# Patient Record
Sex: FEMALE | Race: WHITE | Employment: OTHER | URBAN - METROPOLITAN AREA
[De-identification: names, ages, dates, MRNs, and addresses within clinical notes are randomized per-mention and may not be internally consistent; named-entity substitution may affect disease eponyms.]

---

## 2024-03-06 ENCOUNTER — HOSPITAL ENCOUNTER (OUTPATIENT)
Dept: ULTRASOUND IMAGING | Facility: CLINIC | Age: 44
Discharge: HOME | End: 2024-03-06
Payer: COMMERCIAL

## 2024-03-06 VITALS — HEIGHT: 64 IN | WEIGHT: 105 LBS | BODY MASS INDEX: 17.93 KG/M2

## 2024-03-06 DIAGNOSIS — R92.343 MAMMOGRAPHIC EXTREME DENSITY, BILATERAL BREASTS: ICD-10-CM

## 2024-03-06 PROCEDURE — 76641 ULTRASOUND BREAST COMPLETE: CPT

## 2024-07-24 ENCOUNTER — OFFICE VISIT (OUTPATIENT)
Dept: OBGYN CLINIC | Facility: CLINIC | Age: 44
End: 2024-07-24
Payer: COMMERCIAL

## 2024-07-24 VITALS
BODY MASS INDEX: 17.93 KG/M2 | DIASTOLIC BLOOD PRESSURE: 80 MMHG | HEIGHT: 64 IN | WEIGHT: 105 LBS | SYSTOLIC BLOOD PRESSURE: 116 MMHG

## 2024-07-24 DIAGNOSIS — N80.9 ENDOMETRIOSIS DETERMINED BY LAPAROSCOPY: ICD-10-CM

## 2024-07-24 DIAGNOSIS — R10.31 RLQ ABDOMINAL PAIN: Primary | ICD-10-CM

## 2024-07-24 PROCEDURE — 99204 OFFICE O/P NEW MOD 45 MIN: CPT | Performed by: STUDENT IN AN ORGANIZED HEALTH CARE EDUCATION/TRAINING PROGRAM

## 2024-07-24 NOTE — PROGRESS NOTES
"Ambulatory Visit  Name: Izzy Magaña      : 1980      MRN: [id number]  Encounter Provider: Lori Taylor MD  Encounter Date: 2024   Encounter department: [facility]    Assessment & Plan   1. RLQ abdominal pain  -     US pelvis complete w transvaginal; Future; Expected date: 2024  2. Endometriosis determined by laparoscopy  - reviewed general treatment recommendations for endometriosis to treat if not attempting pregnancy or actively pregnant, for prevention of further growth of lesions. Encouraged Izzy to consider this, as she has deferred embryo implantation until October.   - recommend TVUS to determine if any visible endometriomas. If visible lesions seen, would recommend discussion with [facility] regarding timing of surgical intervention and embryo implantation     History of Present Illness     Izzy Magaña is a 43 y.o. female who presents for consultation regarding worsening RLQ pain.     She has a long history of endometriosis, s/p debulking in 2018 in [place]. Attempted pregnancy after this, without success, then did IVF for her child, born in 10/2022 ( delivery, scheduled/elective).    Previously had IUD in place, removed 2023, as she had thought that she would do an embryo transfer. However, she has since decided that she would like to wait until October.     Since IUD removal, she notes that she has developed RLQ pain, consistent with her previous endometriosis pain. She is seeking evaluation, unsure if she should undergo laparoscopy prior to embryo transfer.     Review of Systems as above    Objective     /80 (BP Location: Left arm, Patient Position: Sitting, Cuff Size: Standard)   Ht 5' 4\" (1.626 m)   Wt 47.6 kg (105 lb)   LMP 2024 (Exact Date)   BMI 18.02 kg/m²     Physical Exam  Vitals and nursing note reviewed.   Constitutional:       General: She is not in acute distress.     Appearance: She is well-developed. "   HENT:      Head: Normocephalic and atraumatic.   Cardiovascular:      Rate and Rhythm: Normal rate.   Pulmonary:      Effort: Pulmonary effort is normal. No respiratory distress.   Genitourinary:     General: Normal vulva.      Vagina: Normal.      Cervix: Normal. No cervical motion tenderness, lesion or cervical bleeding.      Uterus: Deviated and fixed.       Comments: No endometrial implants visible in vagina  Uterus somewhat deviated and fixed to the right, particularly in the area of expressed discomfort. No discrete mass/cyst palpated  Skin:     General: Skin is warm and dry.      Capillary Refill: Capillary refill takes less than 2 seconds.   Neurological:      Mental Status: She is alert.   Psychiatric:         Mood and Affect: Mood normal.     Administrative Statements       [facility] records reviewed - no Pap collected there, but documentation of Pap 2022. Unclear if records visualized.   Labor and Delivery records reviewed from [facility] - operative report without any note of endometriosis or findings in the pelvis

## 2024-08-02 ENCOUNTER — HOSPITAL ENCOUNTER (OUTPATIENT)
Dept: ULTRASOUND IMAGING | Facility: CLINIC | Age: 44
Discharge: HOME | End: 2024-08-02
Payer: COMMERCIAL

## 2024-08-02 DIAGNOSIS — R10.31 RLQ ABDOMINAL PAIN: ICD-10-CM

## 2024-08-02 PROCEDURE — 76856 US EXAM PELVIC COMPLETE: CPT

## 2024-08-02 PROCEDURE — 76830 TRANSVAGINAL US NON-OB: CPT

## 2024-10-17 ENCOUNTER — TELEPHONE (OUTPATIENT)
Age: 44
End: 2024-10-17

## 2024-10-17 DIAGNOSIS — Z12.31 SCREENING MAMMOGRAM, ENCOUNTER FOR: Primary | ICD-10-CM

## 2024-10-17 NOTE — TELEPHONE ENCOUNTER
Pt called looking to obtain script for mammo stating she is due 11/1/24. [place] [facility] [place] Pt is scheduled for her yearly on 1/2/25 and on the wait list for any sooner appts. Thank you.

## 2024-11-09 ENCOUNTER — HOSPITAL ENCOUNTER (OUTPATIENT)
Dept: MAMMOGRAPHY | Facility: CLINIC | Age: 44
Discharge: HOME | End: 2024-11-09
Payer: COMMERCIAL

## 2024-11-09 DIAGNOSIS — Z12.31 SCREENING MAMMOGRAM, ENCOUNTER FOR: ICD-10-CM

## 2024-11-09 PROCEDURE — 77067 SCR MAMMO BI INCL CAD: CPT

## 2024-11-09 PROCEDURE — 77063 BREAST TOMOSYNTHESIS BI: CPT

## 2025-05-20 ENCOUNTER — ANNUAL EXAM (OUTPATIENT)
Age: 45
End: 2025-05-20
Payer: COMMERCIAL

## 2025-05-20 VITALS
WEIGHT: 106 LBS | BODY MASS INDEX: 18.1 KG/M2 | SYSTOLIC BLOOD PRESSURE: 100 MMHG | HEIGHT: 64 IN | DIASTOLIC BLOOD PRESSURE: 76 MMHG

## 2025-05-20 DIAGNOSIS — Z72.51 HIGH RISK HETEROSEXUAL BEHAVIOR: ICD-10-CM

## 2025-05-20 DIAGNOSIS — Z12.31 ENCOUNTER FOR SCREENING MAMMOGRAM FOR BREAST CANCER: ICD-10-CM

## 2025-05-20 DIAGNOSIS — Z12.4 ENCOUNTER FOR SCREENING FOR CERVICAL CANCER: Primary | ICD-10-CM

## 2025-05-20 DIAGNOSIS — N80.9 ENDOMETRIOSIS DETERMINED BY LAPAROSCOPY: ICD-10-CM

## 2025-05-20 DIAGNOSIS — Z11.3 SCREENING FOR STDS (SEXUALLY TRANSMITTED DISEASES): ICD-10-CM

## 2025-05-20 PROCEDURE — 87591 N.GONORRHOEAE DNA AMP PROB: CPT | Performed by: STUDENT IN AN ORGANIZED HEALTH CARE EDUCATION/TRAINING PROGRAM

## 2025-05-20 PROCEDURE — 99396 PREV VISIT EST AGE 40-64: CPT | Performed by: STUDENT IN AN ORGANIZED HEALTH CARE EDUCATION/TRAINING PROGRAM

## 2025-05-20 PROCEDURE — 87491 CHLMYD TRACH DNA AMP PROBE: CPT | Performed by: STUDENT IN AN ORGANIZED HEALTH CARE EDUCATION/TRAINING PROGRAM

## 2025-05-20 PROCEDURE — 87661 TRICHOMONAS VAGINALIS AMPLIF: CPT | Performed by: STUDENT IN AN ORGANIZED HEALTH CARE EDUCATION/TRAINING PROGRAM

## 2025-05-20 PROCEDURE — G0476 HPV COMBO ASSAY CA SCREEN: HCPCS | Performed by: STUDENT IN AN ORGANIZED HEALTH CARE EDUCATION/TRAINING PROGRAM

## 2025-05-20 PROCEDURE — 99214 OFFICE O/P EST MOD 30 MIN: CPT | Performed by: STUDENT IN AN ORGANIZED HEALTH CARE EDUCATION/TRAINING PROGRAM

## 2025-05-20 PROCEDURE — G0145 SCR C/V CYTO,THINLAYER,RESCR: HCPCS | Performed by: STUDENT IN AN ORGANIZED HEALTH CARE EDUCATION/TRAINING PROGRAM

## 2025-05-20 RX ORDER — LORATADINE 10 MG/1
10 TABLET ORAL DAILY
COMMUNITY

## 2025-05-20 RX ORDER — NORGESTIMATE AND ETHINYL ESTRADIOL 0.25-0.035
1 KIT ORAL DAILY
Qty: 84 TABLET | Refills: 2 | Status: SHIPPED | OUTPATIENT
Start: 2025-05-20

## 2025-05-20 NOTE — PROGRESS NOTES
Izzy Magaña  1980    Assessment and Plan:  Yearly exam without abnormality.     -Pap collected today. We reviewed ASCCP guidelines for Pap testing today.   -Mammo slip provided   -STD testing today  -Endometriosis: reviewed pathophysiology and first line management of endometriosis today. Recommend TVUS for evaluation for possible hydrosalpinx, as well as endometrioma. Discussed that this would impact plan for surgical management. Discussed options between medical management and hysterectomy/BSO generally, and she will consider her options further. Safe and effective use of OCP reviewed and she will start this, f/u in 3 months for BP and symptom check, possible preop.     RTO one year for yearly exam or sooner as needed.        CC:  Yearly exam    S:  44 y.o. female here for yearly exam.     Menarche: 15.    Patient's last menstrual period was 2025 (exact date).  Contraception: condoms   - s/p failed ET x2 since last visit and has decided that she is done   Last Pap: none in chart   - notes history of abnormal in   Last Mammo: 2024  Last Colonoscopy: 2017 per pt.    Non-smoker, non drinker  Exercises irregularly.    Doing ok overall.     Her cycles are regular, not heavy or crampy. She notes continued RLQ pain, unrelated to her cycle. During [facility] evaluation, possible hydrosalpinx was noted on imaging, so she enquires as to whether this could be the cause. She did not have HSG for this, just ultrasound. Now that she has completed childbearing, she is interested in treatment for endometriosis, possibly surgery. She is particularly interested in salpingectomy, cites reduction of cancer risk with same.     Sexual activity: She is sexually active without pain, bleeding or dryness.    STD testing:  She does want STD testing today.     Gardasil:  She has not had the Gardasil series.     Family hx of breast cancer: No  Family hx of ovarian cancer: No  Family hx of colon cancer: No    Denies hot  flushes, dyspareunia, abnormal uterine bleeding, urinary/fecal incontinence, changes in energy levels, mood.     Current Medications[1]  Social History     Socioeconomic History    Marital status: /Civil Union     Spouse name: Not on file    Number of children: Not on file    Years of education: Not on file    Highest education level: Not on file   Occupational History    Not on file   Tobacco Use    Smoking status: Never    Smokeless tobacco: Never   Substance and Sexual Activity    Alcohol use: Yes     Alcohol/week: 3.0 standard drinks of alcohol     Types: 3 Glasses of wine per week    Drug use: Never    Sexual activity: Yes     Partners: Male     Birth control/protection: None   Other Topics Concern    Not on file   Social History Narrative    Not on file     Social Drivers of Health     Financial Resource Strain: Low Risk  (2/28/2024)    Received from [employer]    Financial Resource Strain     Do you have any trouble paying for your medications, or do you think you might in the future?: No     Does your family have trouble paying for medicine? (Household - for ages 0-17 years): Not on file   Food Insecurity: Unknown (2/28/2024)    Received from [employer]    Food Insecurity     Within the past 12 months, you worried that your food would run out before you got the money to buy more.: Patient declined     Within the past 12 months, the food you bought just didn't last and you didn't have money to get more.: Patient declined     Do you need food for this week?: No   Transportation Needs: No Transportation Needs (2/28/2024)    Received from [employer]    Transportation Needs     READ ONLY Do you have trouble getting a ride to medical visits or work?: Never True     Does your family have a hard time getting a ride to doctors’ visits? (Household - for ages 0-17 years): Not on file     Has lack of transportation kept you from medical appointments, meetings, work, or from getting things needed for daily living?  "Check all that apply. (Adult - for ages 18 years and over): Not on file     Do you (or your family) have trouble finding or paying for a ride (transportation)? (Household - for ages 0-17 years): Not on file   Physical Activity: Not on file   Stress: Not on file   Social Connections: Socially Integrated (2/28/2024)    Received from [email or web address]     How often do you feel lonely or isolated from those around you?: Rarely   Intimate Partner Violence: Not on file   Housing Stability: Low Risk  (2/28/2024)    Received from [facility]    Housing Stability     Do you currently live in a shelter or have no steady place to sleep at night?: No     READ ONLY Do you think you are at risk of becoming homeless?: No     Does your family worry about paying for your home or becoming homeless? (Household - for ages 0-17 years): Not on file     Are you homeless or worried that you might be in the future? (Adult - for ages 18 years and over): Not on file     Are you (or your family) homeless or worried that you might be in the future? (Household - for ages 0-17 years): Not on file     Family History   Problem Relation Age of Onset    Stomach cancer Mother     Cancer Mother         Stomach    No Known Problems Father     No Known Problems Sister     No Known Problems Maternal Grandmother     No Known Problems Maternal Grandfather     No Known Problems Paternal Grandmother     No Known Problems Paternal Grandfather     Cervical cancer Paternal Aunt     Breast cancer Neg Hx     Uterine cancer Neg Hx     Colon cancer Neg Hx       Past Medical History:   Diagnosis Date    Endometriosis 2016         O:  Blood pressure 100/76, height 5' 4\" (1.626 m), weight 48.1 kg (106 lb), last menstrual period 05/01/2025.    Patient appears well and is not in distress  Neck is supple without masses  Breasts are symmetrical without mass, tenderness, nipple discharge, skin changes or adenopathy.   Abdomen is soft and nontender without " masses.   External genitals are normal without lesions or rashes.  Urethral meatus and urethra are normal  Bladder is normal to palpation  Vagina is normal without discharge or bleeding.   Cervix is normal without discharge or lesion.   Uterus is normal, mobile, nontender without palpable mass.  Adnexa are normal, nontender, without palpable mass.           [1]   Current Outpatient Medications:     loratadine (CLARITIN) 10 mg tablet, Take 10 mg by mouth daily, Disp: , Rfl:     norgestimate-ethinyl estradiol (Sprintec 28) 0.25-35 MG-MCG per tablet, Take 1 tablet by mouth daily, Disp: 84 tablet, Rfl: 2

## 2025-05-22 ENCOUNTER — RESULTS FOLLOW-UP (OUTPATIENT)
Dept: OTHER | Facility: HOSPITAL | Age: 45
End: 2025-05-22

## 2025-05-22 LAB
C TRACH DNA SPEC QL NAA+PROBE: NEGATIVE
HPV HR 12 DNA CVX QL NAA+PROBE: NEGATIVE
HPV16 DNA CVX QL NAA+PROBE: NEGATIVE
HPV18 DNA CVX QL NAA+PROBE: NEGATIVE
N GONORRHOEA DNA SPEC QL NAA+PROBE: NEGATIVE
T VAGINALIS DNA SPEC QL NAA+PROBE: NEGATIVE

## 2025-05-23 LAB
LAB AP GYN PRIMARY INTERPRETATION: NORMAL
Lab: NORMAL

## 2025-06-09 ENCOUNTER — HOSPITAL ENCOUNTER (OUTPATIENT)
Dept: ULTRASOUND IMAGING | Facility: HOSPITAL | Age: 45
Discharge: HOME | End: 2025-06-09
Attending: STUDENT IN AN ORGANIZED HEALTH CARE EDUCATION/TRAINING PROGRAM
Payer: COMMERCIAL

## 2025-06-09 DIAGNOSIS — N80.9 ENDOMETRIOSIS DETERMINED BY LAPAROSCOPY: ICD-10-CM

## 2025-06-09 PROCEDURE — 76830 TRANSVAGINAL US NON-OB: CPT

## 2025-06-09 PROCEDURE — 76856 US EXAM PELVIC COMPLETE: CPT
